# Patient Record
Sex: FEMALE | URBAN - METROPOLITAN AREA
[De-identification: names, ages, dates, MRNs, and addresses within clinical notes are randomized per-mention and may not be internally consistent; named-entity substitution may affect disease eponyms.]

---

## 2024-02-09 ENCOUNTER — NURSING HOME VISIT (OUTPATIENT)
Dept: POST ACUTE CARE | Facility: EXTERNAL LOCATION | Age: 89
End: 2024-02-09
Payer: MEDICARE

## 2024-02-09 DIAGNOSIS — Z87.81 S/P RIGHT HIP FRACTURE: ICD-10-CM

## 2024-02-09 DIAGNOSIS — R42 VERTIGO: ICD-10-CM

## 2024-02-09 DIAGNOSIS — I10 HYPERTENSION, ESSENTIAL: ICD-10-CM

## 2024-02-09 DIAGNOSIS — R73.03 BORDERLINE DIABETES: ICD-10-CM

## 2024-02-09 DIAGNOSIS — F32.9 REACTIVE DEPRESSION: Primary | ICD-10-CM

## 2024-02-09 PROCEDURE — 99349 HOME/RES VST EST MOD MDM 40: CPT

## 2024-02-09 NOTE — LETTER
Patient: Cynthia Desai  : 1935    Encounter Date: 2024    PROGRESS NOTE    Subjective  Chief complaint: Cynthia Desai is a 88 y.o. female who is an assisted living facility patient being seen and evaluated for  general medical care and monthly follow-up    HPI:  Patient seen and evaluated for general medical care and monthly follow-up.  Patient is a new admission to [facility]  for assistance with medical care and therapy.  PMH includes R hip fracture with ORIF (), breast cancer, mastectomy, borderline diabetes, hypothyroidism, HTN, vertigo, HLD, vitamin D deficiency.  Requires assistance with ADLs and wheelchair for mobility.  Patient is oriented,  pleasant and converses easily.  Offers no complaints of pain.  Engages in  conversations and meals with others.  BP at goal.  Appetite good- no S/S hyper or hypoglycemia.  R hip ORIF site CDI. No concerns per nursing          Objective  Vital signs:  100/58-97.3-98-18-97%    Physical Exam  Constitutional:       Appearance: Normal appearance.   HENT:      Head: Normocephalic.      Mouth/Throat:      Mouth: Mucous membranes are moist.   Eyes:      Extraocular Movements: Extraocular movements intact.   Cardiovascular:      Rate and Rhythm: Normal rate and regular rhythm.      Pulses: Normal pulses.      Heart sounds: Normal heart sounds.   Pulmonary:      Effort: Pulmonary effort is normal.      Breath sounds: Normal breath sounds.   Abdominal:      General: Bowel sounds are normal.      Palpations: Abdomen is soft.   Musculoskeletal:         General: Normal range of motion.      Cervical back: Normal range of motion and neck supple.      Comments:  General weakness   right hip ORIF site   Skin:     General: Skin is warm and dry.      Capillary Refill: Capillary refill takes less than 2 seconds.   Neurological:      Mental Status: She is alert and oriented to person, place, and time.   Psychiatric:         Mood and Affect: Mood normal.          Behavior: Behavior normal.         Assessment/Plan  Problem List Items Addressed This Visit       Borderline diabetes      Stable    No S/S hyper or hypoglycemia   Metformin   monitor         Hypertension, essential      stable    BP at goal   monitor         S/P right hip fracture      Stable   pain management   Therapy   W/C for mobility   monitor         Depression - Primary      Situational depression s/p R hip fracture 1/24   Patient does not want to take any medication at this time   Engages with other residents and staff   Monitor           Vertigo      Reinforced calling for assistance when needed   Reinforced changing positions slowly          Medications, treatments, and labs reviewed  Continue medications and treatments as listed in EMR    DANIELA Tompkins      Electronically Signed By: DANIELA Tompkins   2/19/24  4:32 PM

## 2024-02-19 PROBLEM — I10 HYPERTENSION, ESSENTIAL: Status: ACTIVE | Noted: 2024-02-19

## 2024-02-19 PROBLEM — R42 VERTIGO: Status: ACTIVE | Noted: 2024-02-19

## 2024-02-19 PROBLEM — E78.5 HLD (HYPERLIPIDEMIA): Status: ACTIVE | Noted: 2024-02-19

## 2024-02-19 PROBLEM — E03.9 HYPOTHYROIDISM: Status: ACTIVE | Noted: 2024-02-19

## 2024-02-19 PROBLEM — R73.03 BORDERLINE DIABETES: Status: ACTIVE | Noted: 2024-02-19

## 2024-02-19 PROBLEM — Z87.81 S/P RIGHT HIP FRACTURE: Status: ACTIVE | Noted: 2024-02-19

## 2024-02-19 PROBLEM — F32.A DEPRESSION: Status: ACTIVE | Noted: 2024-02-19

## 2024-02-19 NOTE — PROGRESS NOTES
PROGRESS NOTE    Subjective   Chief complaint: Cynthia Desai is a 88 y.o. female who is an assisted living facility patient being seen and evaluated for  general medical care and monthly follow-up    HPI:  Patient seen and evaluated for general medical care and monthly follow-up.  Patient is a new admission to [facility] 2/7 for assistance with medical care and therapy.  PMH includes R hip fracture with ORIF (1/24), breast cancer, mastectomy, borderline diabetes, hypothyroidism, HTN, vertigo, HLD, vitamin D deficiency.  Requires assistance with ADLs and wheelchair for mobility.  Patient is oriented,  pleasant and converses easily.  Offers no complaints of pain.  Engages in  conversations and meals with others.  BP at goal.  Appetite good- no S/S hyper or hypoglycemia.  R hip ORIF site CDI. No concerns per nursing          Objective   Vital signs:  100/58-97.3-98-18-97%    Physical Exam  Constitutional:       Appearance: Normal appearance.   HENT:      Head: Normocephalic.      Mouth/Throat:      Mouth: Mucous membranes are moist.   Eyes:      Extraocular Movements: Extraocular movements intact.   Cardiovascular:      Rate and Rhythm: Normal rate and regular rhythm.      Pulses: Normal pulses.      Heart sounds: Normal heart sounds.   Pulmonary:      Effort: Pulmonary effort is normal.      Breath sounds: Normal breath sounds.   Abdominal:      General: Bowel sounds are normal.      Palpations: Abdomen is soft.   Musculoskeletal:         General: Normal range of motion.      Cervical back: Normal range of motion and neck supple.      Comments:  General weakness   right hip ORIF site   Skin:     General: Skin is warm and dry.      Capillary Refill: Capillary refill takes less than 2 seconds.   Neurological:      Mental Status: She is alert and oriented to person, place, and time.   Psychiatric:         Mood and Affect: Mood normal.         Behavior: Behavior normal.         Assessment/Plan   Problem List Items Addressed  This Visit       Borderline diabetes      Stable    No S/S hyper or hypoglycemia   Metformin   monitor         Hypertension, essential      stable    BP at goal   monitor         S/P right hip fracture      Stable   pain management   Therapy   W/C for mobility   monitor         Depression - Primary      Situational depression s/p R hip fracture 1/24   Patient does not want to take any medication at this time   Engages with other residents and staff   Monitor           Vertigo      Reinforced calling for assistance when needed   Reinforced changing positions slowly          Medications, treatments, and labs reviewed  Continue medications and treatments as listed in EMR    Lina Prieto, APRN-CNP

## 2024-02-19 NOTE — ASSESSMENT & PLAN NOTE
Situational depression s/p R hip fracture 1/24   Patient does not want to take any medication at this time   Engages with other residents and staff   Monitor